# Patient Record
Sex: MALE | Race: BLACK OR AFRICAN AMERICAN | NOT HISPANIC OR LATINO | Employment: UNEMPLOYED | URBAN - METROPOLITAN AREA
[De-identification: names, ages, dates, MRNs, and addresses within clinical notes are randomized per-mention and may not be internally consistent; named-entity substitution may affect disease eponyms.]

---

## 2017-02-26 ENCOUNTER — HOSPITAL ENCOUNTER (INPATIENT)
Facility: HOSPITAL | Age: 32
LOS: 3 days | Discharge: LEFT BEFORE DISCHARGE | DRG: 156 | End: 2017-03-01
Attending: EMERGENCY MEDICINE | Admitting: EMERGENCY MEDICINE
Payer: MEDICAID

## 2017-02-26 DIAGNOSIS — J35.8 TONSILLAR BLEED: Primary | ICD-10-CM

## 2017-02-26 PROBLEM — R07.0 THROAT PAIN IN ADULT: Status: ACTIVE | Noted: 2017-02-26

## 2017-02-26 LAB
ALBUMIN SERPL BCP-MCNC: 3.9 G/DL
ALP SERPL-CCNC: 57 U/L
ALT SERPL W/O P-5'-P-CCNC: 26 U/L
ANION GAP SERPL CALC-SCNC: 10 MMOL/L
AST SERPL-CCNC: 17 U/L
BASOPHILS # BLD AUTO: 0.03 K/UL
BASOPHILS NFR BLD: 0.4 %
BILIRUB SERPL-MCNC: 0.5 MG/DL
BUN SERPL-MCNC: 12 MG/DL
CALCIUM SERPL-MCNC: 9.2 MG/DL
CHLORIDE SERPL-SCNC: 105 MMOL/L
CO2 SERPL-SCNC: 25 MMOL/L
CREAT SERPL-MCNC: 0.9 MG/DL
DIFFERENTIAL METHOD: ABNORMAL
EOSINOPHIL # BLD AUTO: 0.1 K/UL
EOSINOPHIL NFR BLD: 1.1 %
ERYTHROCYTE [DISTWIDTH] IN BLOOD BY AUTOMATED COUNT: 14.4 %
EST. GFR  (AFRICAN AMERICAN): >60 ML/MIN/1.73 M^2
EST. GFR  (NON AFRICAN AMERICAN): >60 ML/MIN/1.73 M^2
GLUCOSE SERPL-MCNC: 97 MG/DL
HCT VFR BLD AUTO: 40.9 %
HGB BLD-MCNC: 13.5 G/DL
INR PPP: 1
LYMPHOCYTES # BLD AUTO: 3.6 K/UL
LYMPHOCYTES NFR BLD: 50.2 %
MCH RBC QN AUTO: 27.6 PG
MCHC RBC AUTO-ENTMCNC: 33 %
MCV RBC AUTO: 84 FL
MONOCYTES # BLD AUTO: 0.6 K/UL
MONOCYTES NFR BLD: 8.1 %
NEUTROPHILS # BLD AUTO: 2.9 K/UL
NEUTROPHILS NFR BLD: 39.9 %
PLATELET # BLD AUTO: 349 K/UL
PMV BLD AUTO: 9.9 FL
POTASSIUM SERPL-SCNC: 3.5 MMOL/L
PROT SERPL-MCNC: 8 G/DL
PROTHROMBIN TIME: 10.9 SEC
RBC # BLD AUTO: 4.9 M/UL
SODIUM SERPL-SCNC: 140 MMOL/L
WBC # BLD AUTO: 7.15 K/UL

## 2017-02-26 PROCEDURE — G0378 HOSPITAL OBSERVATION PER HR: HCPCS

## 2017-02-26 PROCEDURE — 85025 COMPLETE CBC W/AUTO DIFF WBC: CPT

## 2017-02-26 PROCEDURE — 25000003 PHARM REV CODE 250: Performed by: HOSPITALIST

## 2017-02-26 PROCEDURE — 85610 PROTHROMBIN TIME: CPT

## 2017-02-26 PROCEDURE — 80053 COMPREHEN METABOLIC PANEL: CPT

## 2017-02-26 PROCEDURE — 11000001 HC ACUTE MED/SURG PRIVATE ROOM

## 2017-02-26 PROCEDURE — 99284 EMERGENCY DEPT VISIT MOD MDM: CPT

## 2017-02-26 PROCEDURE — 99223 1ST HOSP IP/OBS HIGH 75: CPT | Mod: ,,, | Performed by: HOSPITALIST

## 2017-02-26 RX ORDER — DEXAMETHASONE SODIUM PHOSPHATE 4 MG/ML
8 INJECTION, SOLUTION INTRA-ARTICULAR; INTRALESIONAL; INTRAMUSCULAR; INTRAVENOUS; SOFT TISSUE EVERY 8 HOURS
Status: DISCONTINUED | OUTPATIENT
Start: 2017-02-27 | End: 2017-02-26

## 2017-02-26 RX ORDER — DEXAMETHASONE SODIUM PHOSPHATE 4 MG/ML
8 INJECTION, SOLUTION INTRA-ARTICULAR; INTRALESIONAL; INTRAMUSCULAR; INTRAVENOUS; SOFT TISSUE EVERY 8 HOURS
Status: DISCONTINUED | OUTPATIENT
Start: 2017-02-27 | End: 2017-03-01 | Stop reason: HOSPADM

## 2017-02-26 RX ORDER — DEXTROSE MONOHYDRATE, SODIUM CHLORIDE, AND POTASSIUM CHLORIDE 50; 1.49; 4.5 G/1000ML; G/1000ML; G/1000ML
INJECTION, SOLUTION INTRAVENOUS CONTINUOUS
Status: DISCONTINUED | OUTPATIENT
Start: 2017-02-27 | End: 2017-03-01 | Stop reason: HOSPADM

## 2017-02-26 RX ORDER — CLINDAMYCIN PHOSPHATE 600 MG/50ML
600 INJECTION, SOLUTION INTRAVENOUS
Status: DISCONTINUED | OUTPATIENT
Start: 2017-02-26 | End: 2017-03-01 | Stop reason: HOSPADM

## 2017-02-26 RX ORDER — ONDANSETRON 2 MG/ML
4 INJECTION INTRAMUSCULAR; INTRAVENOUS EVERY 8 HOURS PRN
Status: DISCONTINUED | OUTPATIENT
Start: 2017-02-27 | End: 2017-03-01 | Stop reason: HOSPADM

## 2017-02-26 RX ORDER — MORPHINE SULFATE 2 MG/ML
2 INJECTION, SOLUTION INTRAMUSCULAR; INTRAVENOUS
Status: DISCONTINUED | OUTPATIENT
Start: 2017-02-27 | End: 2017-03-01 | Stop reason: HOSPADM

## 2017-02-26 RX ORDER — AMOXICILLIN 250 MG
2 CAPSULE ORAL DAILY
Status: DISCONTINUED | OUTPATIENT
Start: 2017-02-26 | End: 2017-03-01 | Stop reason: HOSPADM

## 2017-02-26 RX ORDER — OXYCODONE AND ACETAMINOPHEN 10; 325 MG/1; MG/1
1 TABLET ORAL EVERY 4 HOURS PRN
Status: DISCONTINUED | OUTPATIENT
Start: 2017-02-26 | End: 2017-03-01 | Stop reason: HOSPADM

## 2017-02-26 RX ADMIN — OXYCODONE HYDROCHLORIDE AND ACETAMINOPHEN 1 TABLET: 10; 325 TABLET ORAL at 09:02

## 2017-02-26 RX ADMIN — CLINDAMYCIN IN 5 PERCENT DEXTROSE 600 MG: 12 INJECTION, SOLUTION INTRAVENOUS at 11:02

## 2017-02-26 RX ADMIN — MORPHINE SULFATE 2 MG: 2 INJECTION, SOLUTION INTRAMUSCULAR; INTRAVENOUS at 11:02

## 2017-02-26 RX ADMIN — CLINDAMYCIN IN 5 PERCENT DEXTROSE 600 MG: 12 INJECTION, SOLUTION INTRAVENOUS at 03:02

## 2017-02-26 RX ADMIN — STANDARDIZED SENNA CONCENTRATE AND DOCUSATE SODIUM 2 TABLET: 8.6; 5 TABLET, FILM COATED ORAL at 09:02

## 2017-02-26 RX ADMIN — DEXTROSE MONOHYDRATE, SODIUM CHLORIDE, AND POTASSIUM CHLORIDE: 50; 4.5; 1.49 INJECTION, SOLUTION INTRAVENOUS at 11:02

## 2017-02-26 RX ADMIN — CLINDAMYCIN IN 5 PERCENT DEXTROSE 600 MG: 12 INJECTION, SOLUTION INTRAVENOUS at 09:02

## 2017-02-26 RX ADMIN — OXYCODONE HYDROCHLORIDE AND ACETAMINOPHEN 1 TABLET: 10; 325 TABLET ORAL at 04:02

## 2017-02-26 NOTE — ED NOTES
Rounding completed; no s/s of distress; respirations regular, even and unlabored; pain is currently 10/10, provider notified and with no current interventions at this time; bed in lowest position with side rails up x2; personal items and call light within reach; pt updated on POC and verbalizes understanding; continue to monitor for any changes

## 2017-02-26 NOTE — PROGRESS NOTES
"0810: Patient arrived from ED. Ambulated from stretcher to bed. Oriented to new room. VSS on RA and afebrile. Pain complaining of 10/10 pain. Left side of throat/tonsil area is swollen and red. Currently bleeding with clots present. MD at bedside and examining patient. Bed low, locked and call light is within reach. Will continue to monitor.     0915: MD at bedside again to assess large amount of bleeding with clots. IV antibiotics started and infusing. PRN meds given for pain. Ordered to call MD if bleeding persist for another hour... Patient might need possible transfer to [facility] due to ENT being unavailable at this hospital until Tuesday. Will continue to monitor bleeding and keep MD updated.     0955: Called to patients room to see large amount of blood that he is still spitting up. Patient wishes to go to [facility] regardless if bleeding stops soon because " I want this out ". Will speak to MD Baltazar and let him know patients wishes and what we need to do to start the transfer process.   "

## 2017-02-26 NOTE — H&P
History & Physical  Hospital Medicine      SUBJECTIVE:     Chief Complaint/Reason for Admission:  Left tonsil bleeding and pain    History of Present Illness:  Patient is a 31 y.o. male presents agreeing with per ED note: that has persisted for the past 8 days. The patient states that the intermittent episodes are spaced approximately 5 minutes apart. He also endorses throat pain, which he rates a 10/10 in severity. He mentions no fever, chills, epistaxis, mouth sores, or trouble swallowing. He reports no identifying, alleviating, or exacerbating factors. He denies foreced emesis or any trauma. Although he admits that he has had similar episodes in the past, approximately 1 per year, the patient claims that this episode is the most severe. He claims that he was seen for his complaint at an ED in [place] on February 18, 8 days ago, and was given ibuprofen. He states that he was seen at this facility yesterday for the same complaint and once again received ibuprofen.      He denies fever.  States he has had this problem since he was a child, and it occurs occasionally.  There was talk in the past of having his tonsils removed, but this has not occurred.  No NVD.        PTA Medications   Medication Sig    ibuprofen (ADVIL,MOTRIN) 600 MG tablet Take 1 tablet (600 mg total) by mouth every 6 (six) hours as needed for Pain.       Review of patient's allergies indicates:  No Known Allergies     Past Medical History:   Diagnosis Date    Otalgia of both ears      History reviewed. No pertinent surgical history.  Family History   Problem Relation Age of Onset    Hypertension Mother     Diabetes Father      Social History   Substance Use Topics    Smoking status: Former Smoker     Types: Cigarettes    Smokeless tobacco: None    Alcohol use No       Review of Systems:  Constitutional: no fever or chills  Eyes: no visual changes  ENT: positive for sore throat  Respiratory: no cough or shortness of breath  Cardiovascular: no  chest pain or palpitations  Gastrointestinal: no nausea or vomiting, no abdominal pain or change in bowel habits  Genitourinary: no hematuria or dysuria  Integument/Breast: no rash or pruritis  Hematologic/Lymphatic: no easy bruising or lymphadenopathy  Musculoskeletal: no arthralgias or myalgias  Neurological: no seizures or tremors  Behavioral/Psych: no auditory or visual hallucinations  Endocrine: no heat or cold intolerance    OBJECTIVE:     Vital Signs (Most Recent)  Temp: 98.4 °F (36.9 °C) (02/26/17 0810)  Pulse: 71 (02/26/17 0810)  Resp: 18 (02/26/17 0810)  BP: (!) 141/78 (02/26/17 0810)  SpO2: 100 % (02/26/17 0810)    Physical Exam:  General: well developed, well nourished, no distress  Head: normocephalic, atraumatic  Eyes:  conjunctivae/corneas clear. PERRL.  Throat: lips, mucosa, and tongue normal; teeth and gums normal and Left tonsil is swollen with sign of blood, odor possibly consistent with infection.  Airway open.    Neck: supple, symmetrical, trachea midline, no JVD and thyroid not enlarged, symmetric, no tenderness/mass/nodules  Lungs:  clear to auscultation bilaterally and normal respiratory effort  Heart: regular rate and rhythm, S1, S2 normal, no murmur, click, rub or gallop  Abdomen: soft, non-tender non-distented; bowel sounds normal; no masses,  no organomegaly  Extremities: no cyanosis or edema, or clubbing  Skin: Skin color, texture, turgor normal. No rashes or lesions  Lymph Nodes: No cervical or supraclavicular adenopathy  Neurologic: Normal strength and tone. No focal numbness or weakness      Laboratory  CBC:     Recent Labs  Lab 02/26/17  0508   WBC 7.15   RBC 4.90   HGB 13.5*   HCT 40.9      MCV 84   MCH 27.6   MCHC 33.0     CMP:     Recent Labs  Lab 02/26/17  0508   GLU 97   CALCIUM 9.2   ALBUMIN 3.9   PROT 8.0      K 3.5   CO2 25      BUN 12   CREATININE 0.9   ALKPHOS 57   ALT 26   AST 17   BILITOT 0.5     Rapid Strep NEGATIVE    Diagnostic Results:  No  new    ASSESSMENT/PLAN:     Active Hospital Problems    Diagnosis  POA    *Tonsillar bleed [J35.8]  Yes    Throat pain in adult [R07.0]  Yes      Resolved Hospital Problems    Diagnosis Date Resolved POA   No resolved problems to display.         Plan:     Left tonsillar bleeding and swelling  - Some odor to sputum, may indicate infection  - No fever, dysphagia,   - No facial or neck swelling; appears isolated to left tonsil  - Will give clindamycin IV and monitor for improvement  - Avoid NSAID to reduce bleeding  - Percocet PRN for pain.   - He is having bleeding currently, spitting out blood into emesis bag.  Maintaining airway without issue, talking without difficulty.    - ENT is not available here despite consult.  If bleeding persists, will need transfer to [facility] for ENT evaluation.     Otherwise healthy and ambulates well

## 2017-02-26 NOTE — PROGRESS NOTES
Report given to Ayah at [facility]. Patient is AAO x4. VS have been stable throughout shift. In NAD. Bleeding has currently stopped. Pain is minimal at the moment. Only required PO PRN meds twice. Tolerating liquids. Ambulating independently. Patient is up to date with POC and waiting on [facility] to transport him to [facility] where MD Gonzalez will see him. [facility] is on the way.

## 2017-02-26 NOTE — ED PROVIDER NOTES
Encounter Date: 2/26/2017    SCRIBE #1 NOTE: I, Bridgett Flynn, am scribing for, and in the presence of, Dr. Oliva.       History     Chief Complaint   Patient presents with    ENT bleeding disorder     presents with heavy bleeding bright red blood from tonsils x 4 days. Pt has approx 300 ml blood in emesis bag and water bottle from last 2 hours. Reports 10/10 throat pain     Review of patient's allergies indicates:  No Known Allergies  HPI Comments: Time seen by provider: 4:57 AM    This is a 31 y.o. male who presents with complaint of intermittent left tonsillar bleeding that has persisted for the past 8 days.  The patient states that the intermittent episodes are spaced approximately 5 minutes apart.  He also endorses throat pain, which he rates a 10/10 in severity.  He mentions no fever, chills, epistaxis, mouth sores, or trouble swallowing.  He reports no identifying, alleviating, or exacerbating factors.  He denies foreced emesis or any trauma.  Although he admits that he has had similar episodes in the past, approximately 1 per year, the patient claims that this episode is the most severe.  He claims that he was seen for his complaint at an ED in [place] on February 18, 8 days ago, and was given ibuprofen.  He states that he was seen at this facility yesterday for the same complaint and once again received ibuprofen.  The patient reports no major medical problems or daily medications.  He denies history of blood transfusion.     The history is provided by the patient.     Past Medical History:   Diagnosis Date    Otalgia of both ears      No past surgical history on file.  No family history on file.  Social History   Substance Use Topics    Smoking status: Former Smoker     Types: Cigarettes    Smokeless tobacco: Not on file    Alcohol use No     Review of Systems   Constitutional: Negative for chills and fever.   HENT: Negative for congestion, mouth sores, nosebleeds and trouble swallowing.          Positive for left tonsillar bleeding.  Positive for throat pain.   Eyes: Negative for photophobia and redness.   Respiratory: Negative for cough and shortness of breath.    Cardiovascular: Negative for chest pain.   Gastrointestinal: Negative for abdominal pain, nausea and vomiting.   Genitourinary: Negative for dysuria.   Musculoskeletal: Negative for back pain.   Skin: Negative for rash.   Neurological: Negative for weakness, light-headedness and headaches.   Psychiatric/Behavioral: Negative for confusion.       Physical Exam   Initial Vitals   BP Pulse Resp Temp SpO2   02/26/17 0446 02/26/17 0446 02/26/17 0446 02/26/17 0446 02/26/17 0446   140/91 76 18 98.3 °F (36.8 °C) 100 %     Physical Exam    Nursing note and vitals reviewed.  Constitutional: He appears well-developed and well-nourished. He is not diaphoretic. No distress.   HENT:   Head: Normocephalic and atraumatic.   Bleeding from the left tonsil from what appears to look like cuts. Oropharynx is clear and intact.  Moist mucous membranes.   Eyes: Conjunctivae and EOM are normal. Pupils are equal, round, and reactive to light.   Conjunctiva are pink, clear, and intact.   Neck: Normal range of motion. Neck supple.   Cardiovascular: Normal rate, regular rhythm, S1 normal, S2 normal and normal heart sounds. Exam reveals no gallop and no friction rub.    No murmur heard.  Pulmonary/Chest: Breath sounds normal. No respiratory distress. He has no wheezes. He has no rhonchi. He has no rales.   Clear to ascultation bilaterally.   Abdominal: Soft. Bowel sounds are normal. There is no tenderness. There is no rebound and no guarding.   No audible bruits.   Musculoskeletal: Normal range of motion. He exhibits no edema or tenderness.   No lower extremity edema.    Lymphadenopathy:     He has no cervical adenopathy.   Neurological: He is alert and oriented to person, place, and time.   Skin: Skin is warm and dry. No rash noted. No pallor.   Warm and dry. No skin tenting,  rashes, or lesions.    Psychiatric: He has a normal mood and affect. His behavior is normal. Judgment and thought content normal.         ED Course   Procedures  Labs Reviewed   CBC W/ AUTO DIFFERENTIAL - Abnormal; Notable for the following:        Result Value    Hemoglobin 13.5 (*)     Lymph% 50.2 (*)     All other components within normal limits   COMPREHENSIVE METABOLIC PANEL   PROTIME-INR     Imaging Results     None                  Medical Decision Making:   Clinical Tests:   Lab Tests: Ordered and Reviewed  Other:   I have discussed this case with another health care provider.       <> Summary of the Discussion: 6:48 AM.  I have consulted and discussed with Dr. Baltazar, who will admit the patient.            Scribe Attestation:   Scribe #1: I performed the above scribed service and the documentation accurately describes the services I performed. I attest to the accuracy of the note.    Attending Attestation:           Physician Attestation for Scribe:  Physician Attestation Statement for Scribe #1: I, Dr. Oliva, reviewed documentation, as scribed by Bridgett Flynn in my presence, and it is both accurate and complete.         Attending ED Notes:   Emergent evaluation a 31-year-old male with complaint of bleeding from left tonsil.  Patient is afebrile, nontoxic-appearing with stable vital signs except for mild elevation in blood pressure.  No recent tonsillar surgery.  On examination patient does not have epistaxis.  No anterior or posterior nasal bleeding.  Patient is bleeding from left tonsil.  No peritonsillar tonsillar abscess formation.  No tonsillar edema or swelling.  No elevation of white blood cell count.  H&H is 13.5 and 40.9.  Acute findings on CMP.  Patient has slow oozing blood from tonsillar area.  No large hemorrhage.  The patient is extensively counseled on his diagnosis and treatment including all diagnostic, laboratory and physical exam findings.  The patient is admitted in stable  condition.          ED Course     Clinical Impression:     1. Tonsillar bleed                Gopal De Luna MD  02/26/17 0752

## 2017-02-26 NOTE — ED NOTES
Pt c/o bleeding and pain from the L tonsil x2 days. Pt reports he was seen and evaluated here yesterday. Pt reports pain has increased and the bleeding has become worse. Pt is scant amount of active bleeding from L tonsil. Pt has suction set up and is using appropriately.

## 2017-02-26 NOTE — PROGRESS NOTES
Spoke with ENT resident from [facility]. Let her know that the patient will be coming to them shortly. He was assigned to the observation unit bed 8. Waiting on the transfer center to call with information about when  with [facility] will be. Patient updated about transferring over soon and agreeable.

## 2017-02-27 LAB — APTT BLDCRRT: 25.1 SEC

## 2017-02-27 PROCEDURE — 36415 COLL VENOUS BLD VENIPUNCTURE: CPT

## 2017-02-27 PROCEDURE — 63600175 PHARM REV CODE 636 W HCPCS: Performed by: OTOLARYNGOLOGY

## 2017-02-27 PROCEDURE — 85730 THROMBOPLASTIN TIME PARTIAL: CPT

## 2017-02-27 PROCEDURE — 11000001 HC ACUTE MED/SURG PRIVATE ROOM

## 2017-02-27 PROCEDURE — 25500020 PHARM REV CODE 255: Performed by: OTOLARYNGOLOGY

## 2017-02-27 PROCEDURE — 25000003 PHARM REV CODE 250: Performed by: HOSPITALIST

## 2017-02-27 PROCEDURE — 25000003 PHARM REV CODE 250: Performed by: OTOLARYNGOLOGY

## 2017-02-27 RX ADMIN — DEXAMETHASONE SODIUM PHOSPHATE 8 MG: 4 INJECTION, SOLUTION INTRAMUSCULAR; INTRAVENOUS at 05:02

## 2017-02-27 RX ADMIN — DEXTROSE MONOHYDRATE, SODIUM CHLORIDE, AND POTASSIUM CHLORIDE: 50; 4.5; 1.49 INJECTION, SOLUTION INTRAVENOUS at 11:02

## 2017-02-27 RX ADMIN — CLINDAMYCIN IN 5 PERCENT DEXTROSE 600 MG: 12 INJECTION, SOLUTION INTRAVENOUS at 11:02

## 2017-02-27 RX ADMIN — DEXAMETHASONE SODIUM PHOSPHATE 8 MG: 4 INJECTION, SOLUTION INTRAMUSCULAR; INTRAVENOUS at 12:02

## 2017-02-27 RX ADMIN — DEXTROSE MONOHYDRATE, SODIUM CHLORIDE, AND POTASSIUM CHLORIDE: 50; 4.5; 1.49 INJECTION, SOLUTION INTRAVENOUS at 09:02

## 2017-02-27 RX ADMIN — OXYCODONE HYDROCHLORIDE AND ACETAMINOPHEN 1 TABLET: 10; 325 TABLET ORAL at 11:02

## 2017-02-27 RX ADMIN — CLINDAMYCIN IN 5 PERCENT DEXTROSE 600 MG: 12 INJECTION, SOLUTION INTRAVENOUS at 05:02

## 2017-02-27 RX ADMIN — DEXAMETHASONE SODIUM PHOSPHATE 8 MG: 4 INJECTION, SOLUTION INTRAMUSCULAR; INTRAVENOUS at 10:02

## 2017-02-27 RX ADMIN — STANDARDIZED SENNA CONCENTRATE AND DOCUSATE SODIUM 2 TABLET: 8.6; 5 TABLET, FILM COATED ORAL at 09:02

## 2017-02-27 RX ADMIN — IOHEXOL 75 ML: 350 INJECTION, SOLUTION INTRAVENOUS at 02:02

## 2017-02-27 RX ADMIN — MORPHINE SULFATE 2 MG: 2 INJECTION, SOLUTION INTRAMUSCULAR; INTRAVENOUS at 05:02

## 2017-02-27 RX ADMIN — DEXAMETHASONE SODIUM PHOSPHATE 8 MG: 4 INJECTION, SOLUTION INTRAMUSCULAR; INTRAVENOUS at 02:02

## 2017-02-27 NOTE — PROGRESS NOTES
Patient admitted to Room 3 via [facility] accompanied by significant other and infant child. Patient alert and oriented x4. Dr. Yan called to bedside to evaluate patient. New orders noted. At this time, patient is spitting up mostly blood tinged sputum into blue emesis bag. Will continue to monitor.

## 2017-02-27 NOTE — PLAN OF CARE
02/27/17 1629   Discharge Assessment   Assessment Type Discharge Planning Assessment   Assessment information obtained from? Medical Record   Patient/Family In Agreement With Plan unable to assess     Danay Moreno RN, CCM [facility]  SICU/ENT/Vascular Surgery  Ext [phone]

## 2017-02-27 NOTE — PLAN OF CARE
Problem: Patient Care Overview  Goal: Plan of Care Review  Outcome: Ongoing (interventions implemented as appropriate)  Safety precautions maintained. Side rails up x2. Bed in lowest position. Call bell within reach. Patient receiving IV ABX and IVF as per standing orders. Morphine IV PRN given for pain. Will continue to monitor.

## 2017-02-27 NOTE — PROGRESS NOTES
Patient called for nurse to go to room. Moderate amount of bloody sputum noted to emesis bag at this time. Dr. Yan remains on unit and in to see patient. Dr. Yan provided yankeur suction and instructed patient to not place yankeur to back of mouth. Ice water also provided. Within minutes, sputum returned to slightly blood tinged. IV antibiotics and Morphine IVP administered at this time. Patient also complains of pain to his left ear as noted by Dr. Yan. Will continue to monitor.

## 2017-02-27 NOTE — PROGRESS NOTES
Otolaryngology - Head and Neck Surgery  Progress Note    Subjective: Overnight reported some blood in his spit. NPO since midnight. Afebrile. No difficulty breathing.    Objective:  Temp:  [98.5 °F (36.9 °C)-98.9 °F (37.2 °C)]   Pulse:  [68-98]   Resp:  [12-20]   BP: (135-141)/(78-91)   SpO2:  [100 %]     NAD, A/Ox3. No increased WOB, normal voice that is not muffled  CN II-XII intact  MMM, full tongue ROM. There is a small lac anterior to the left anterior tonsillar pillar that appears to be the source of bleeding. The left tonsil is irregular with white exudates. Right tonsil 2+, no exudates. Oral airway clear.  Neck soft, non-tender. No palpable lymphadenopathy.    CT neck w/ contrast reviewed: There is irregularity in the posterior oropharynx that represents intratonsillar abscess vs secretions.     Assessment: 31M w/ history of hemorrhagic tonsillitis admitted for sore throat and bleeding from left tonsil since 2/18. No recent OC/OP surgery. 5 pack-year smoking history, quit years ago.     Plan:   - Ok for liquid diet today  - Will add on for DLB possible tonsillectomy on Wednesday  - Continue clindamycin and decadron at this time    D/w staff Dr. Lara

## 2017-02-27 NOTE — PLAN OF CARE
Problem: Patient Care Overview  Goal: Plan of Care Review  Outcome: Ongoing (interventions implemented as appropriate)  Pt on fall precautions. Yellow fall risk band and socks on pt. Instructed pt to call for assistance as needed and pt voiced understanding. Denies pain or discomfort. Will continue to assess pt's pain and also instructed pt to notify me of any pain, pt voiced understanding. Afebrile. IVFs infusing.

## 2017-02-27 NOTE — CONSULTS
Otolaryngology - Head and Neck Surgery  Consultation Report    Consultation From: [facility]    Chief Complaint: Left tonsil swelling, and bleeding    History of Present Illness: 31 y.o. year old male presents with left tonsillar swelling and bleeding. Patient was admitted to [facility] earlier today with tonsil bleeding and left sided throat pain. Had been given antibiotics at outside hospital a week prior, pain and swelling not improved. Today starting spitting out large amounts of blood, filling multiple emesis bags per patient. Bleeding has since slowed considerably, currently spitting out minimally blood-tinged secretions. He reports that throughout the day the bleeding will start up and quickly stop.   Patient reports frequent episodes of tonsillitis since he was a child that present similarly, however never with bleeding. Has never had a peritonsillar abscess that needed to be drained. Also complains of trismus and left sided otalgia. Afebrile, no leukocytosis.    Review of Systems - Negative except above.    Past Medical History: Patient has a past medical history of Otalgia of both ears.    Past Surgical History: Patient has no past surgical history on file.    Social History: Patient reports that he has quit smoking. His smoking use included Cigarettes. He does not have any smokeless tobacco history on file. He reports that he uses illicit drugs, including Marijuana. He reports that he does not drink alcohol.    Family History: family history includes Diabetes in his father; Hypertension in his mother.    Medications:    clindamycin (CLEOCIN) IVPB  600 mg Intravenous Q6H    [START ON 2/27/2017] dexamethasone  8 mg Intravenous Q8H    senna-docusate 8.6-50 mg  2 tablet Oral Daily       Allergies: Patient has No Known Allergies.    Physical Exam:  Temp:  [98.3 °F (36.8 °C)-98.9 °F (37.2 °C)] 98.9 °F (37.2 °C)  Pulse:  [58-76] 68  Resp:  [12-20] 12  SpO2:  [97 %-100 %] 100 %  BP:  (135-172)/() 138/90        Constitutional: Well appearing / communicating.  NAD.  Eyes: EOM I Bilaterally  Head/Face: Normocephalic.  Negative paranasal sinus pressure/tenderness.  Salivary glands WNL.  House Brackmann I Bilaterally.  Nose: No gross nasal septal deviation. Inferior Turbinates 2+ bilaterally. No septal perforation. No masses/lesions. External nasal skin without masses/lesions.  Oral Cavity: Trismus present. Gingiva/lips WNL.  FOM Soft, no masses palpated. Oral Tongue mobile. Hard Palate WNL.   Oropharynx: Bilateral tonsil swelling, L> R. Both tonsils are cryptic. Left tonsil erythematous with blood clot sitting on anterior portion. No active bleeding.  Neck/Lymphatic: Shotty cervical lymphadenopathy throughout.  No thyromegaly.  No masses noted on exam.  Neuro/Psychiatric: AOx3.  Normal mood and affect.   Cardiovascular: Normal carotid pulses bilaterally, no increasing jugular venous distention noted at cervical region bilaterally.    Respiratory: Normal respiratory effort, no stridor, no retractions noted.      CBC    Recent Labs  Lab 02/26/17  0508   WBC 7.15   HGB 13.5*   HCT 40.9   MCV 84        BMP    Recent Labs  Lab 02/26/17  0508   GLU 97      K 3.5      CO2 25   BUN 12   CREATININE 0.9   CALCIUM 9.2       Imaging Results     None           Assessment: 30 yo man transferred from [facility] with possible left peritonsillar abscess in setting of chronic tonsillitis with left-sided tonsillar bleeding, currently stable    Plan:   -Admit to ENT  -CT w/ contrast prior to performing I&D in setting of bleeding  -IV clindamycin, IV decadron, MIVF  -NPO  -percocet, morphine for pain control  -d/w Dr. Gonzalez

## 2017-02-28 PROCEDURE — 63600175 PHARM REV CODE 636 W HCPCS: Performed by: OTOLARYNGOLOGY

## 2017-02-28 PROCEDURE — 99231 SBSQ HOSP IP/OBS SF/LOW 25: CPT | Mod: ,,, | Performed by: OTOLARYNGOLOGY

## 2017-02-28 PROCEDURE — 25000003 PHARM REV CODE 250: Performed by: HOSPITALIST

## 2017-02-28 PROCEDURE — 11000001 HC ACUTE MED/SURG PRIVATE ROOM

## 2017-02-28 PROCEDURE — 25000003 PHARM REV CODE 250: Performed by: OTOLARYNGOLOGY

## 2017-02-28 RX ADMIN — CLINDAMYCIN IN 5 PERCENT DEXTROSE 600 MG: 12 INJECTION, SOLUTION INTRAVENOUS at 11:02

## 2017-02-28 RX ADMIN — STANDARDIZED SENNA CONCENTRATE AND DOCUSATE SODIUM 2 TABLET: 8.6; 5 TABLET, FILM COATED ORAL at 11:02

## 2017-02-28 RX ADMIN — DEXAMETHASONE SODIUM PHOSPHATE 8 MG: 4 INJECTION, SOLUTION INTRAMUSCULAR; INTRAVENOUS at 05:02

## 2017-02-28 RX ADMIN — DEXAMETHASONE SODIUM PHOSPHATE 8 MG: 4 INJECTION, SOLUTION INTRAMUSCULAR; INTRAVENOUS at 02:02

## 2017-02-28 RX ADMIN — CLINDAMYCIN IN 5 PERCENT DEXTROSE 600 MG: 12 INJECTION, SOLUTION INTRAVENOUS at 05:02

## 2017-02-28 RX ADMIN — DEXTROSE MONOHYDRATE, SODIUM CHLORIDE, AND POTASSIUM CHLORIDE: 50; 4.5; 1.49 INJECTION, SOLUTION INTRAVENOUS at 05:02

## 2017-02-28 RX ADMIN — DEXAMETHASONE SODIUM PHOSPHATE 8 MG: 4 INJECTION, SOLUTION INTRAMUSCULAR; INTRAVENOUS at 11:02

## 2017-02-28 RX ADMIN — OXYCODONE HYDROCHLORIDE AND ACETAMINOPHEN 1 TABLET: 10; 325 TABLET ORAL at 09:02

## 2017-02-28 NOTE — PROGRESS NOTES
Otolaryngology - Head and Neck Surgery  Progress Note    Subjective: NAEON. Bleeding has stopped from left tonsil. Throat pain, trismus, otalgia improved.    Objective:  Temp:  [98 °F (36.7 °C)-99 °F (37.2 °C)]   Pulse:  []   Resp:  [16-18]   BP: (132-148)/(79-96)   SpO2:  [99 %-100 %]     NAD, A/Ox3. No increased WOB, normal voice that is not muffled  CN II-XII intact  MMM, full tongue ROM. Bilateral tonsils 2+. No exudates or blood noted today  Neck soft, non-tender. No palpable lymphadenopathy.    CT neck w/ contrast reviewed: There is irregularity in the posterior oropharynx that represents intratonsillar abscess vs secretions.     Assessment: 31M w/ history of hemorrhagic tonsillitis admitted for sore throat and bleeding from left tonsil since 2/18. No recent OC/OP surgery. 5 pack-year smoking history, quit years ago.     Plan:   - to OR tomorrow for DLB possible tonsillectomy   - will obtain consents today  - NPO at midnight  - Continue clindamycin and decadron at this time    D/w staff Dr. Gonzalez

## 2017-02-28 NOTE — PLAN OF CARE
Problem: Patient Care Overview  Goal: Plan of Care Review  Outcome: Ongoing (interventions implemented as appropriate)  Patient maintained on fall precautions. Bed locked and lowest position. Call light within reach. Patient assessed for pain and prn medication administered as per order. Patient has swollen tonsils with no bleeding noted at this time. Patient instructed to use call light prn. Patient verbalized understanding. Plan of care updated.

## 2017-02-28 NOTE — PLAN OF CARE
Problem: Patient Care Overview  Goal: Plan of Care Review  Outcome: Ongoing (interventions implemented as appropriate)  Safety precautions maintained. Side rails up x2. Bed in lowest position. Call bell within reach. IVF infusing as ordered. IV antibiotics administered as ordered. Tonsils swollen with no bleeding noted. Patient denies any need for pain medications at this time. Will continue to monitor.

## 2017-03-01 VITALS
TEMPERATURE: 99 F | HEIGHT: 69 IN | RESPIRATION RATE: 18 BRPM | SYSTOLIC BLOOD PRESSURE: 148 MMHG | DIASTOLIC BLOOD PRESSURE: 86 MMHG | HEART RATE: 90 BPM | BODY MASS INDEX: 22.22 KG/M2 | OXYGEN SATURATION: 99 % | WEIGHT: 150 LBS

## 2017-03-01 PROCEDURE — 25000003 PHARM REV CODE 250: Performed by: OTOLARYNGOLOGY

## 2017-03-01 PROCEDURE — 25000003 PHARM REV CODE 250: Performed by: HOSPITALIST

## 2017-03-01 PROCEDURE — 63600175 PHARM REV CODE 636 W HCPCS: Performed by: OTOLARYNGOLOGY

## 2017-03-01 RX ADMIN — STANDARDIZED SENNA CONCENTRATE AND DOCUSATE SODIUM 2 TABLET: 8.6; 5 TABLET, FILM COATED ORAL at 09:03

## 2017-03-01 RX ADMIN — DEXTROSE MONOHYDRATE, SODIUM CHLORIDE, AND POTASSIUM CHLORIDE: 50; 4.5; 1.49 INJECTION, SOLUTION INTRAVENOUS at 09:03

## 2017-03-01 RX ADMIN — CLINDAMYCIN IN 5 PERCENT DEXTROSE 600 MG: 12 INJECTION, SOLUTION INTRAVENOUS at 12:03

## 2017-03-01 RX ADMIN — DEXAMETHASONE SODIUM PHOSPHATE 8 MG: 4 INJECTION, SOLUTION INTRAMUSCULAR; INTRAVENOUS at 05:03

## 2017-03-01 RX ADMIN — CLINDAMYCIN IN 5 PERCENT DEXTROSE 600 MG: 12 INJECTION, SOLUTION INTRAVENOUS at 05:03

## 2017-03-01 NOTE — PLAN OF CARE
Problem: Patient Care Overview  Goal: Plan of Care Review  Outcome: Ongoing (interventions implemented as appropriate)  Patient maintained on fall precautions. Bed locked and lowest position. Call light within reach. Patient denies any pain at this time. Patient has IV fluids. Plan of care updated.

## 2017-03-01 NOTE — PLAN OF CARE
Problem: Patient Care Overview  Goal: Plan of Care Review  Outcome: Ongoing (interventions implemented as appropriate)  Rounding every 2 hours to decrease risk of falls. Administer pain medication per MD order to control pain.  Encourage fluids to decrease fluid volume deficit.

## 2017-03-01 NOTE — PROGRESS NOTES
ENT MD paged by nursing @ 2:17 PM to inform that patient has likely left AMA. IV is removed, hospital gown is off, and personal belonging are not @ bedside. Patient was scheduled for DLB c possible tonsillectomy today with Dr. Lara.  He should contact ENT clinic to reschedule @ his convenience.  Please call ENT with any questions or cocnerns.

## 2017-03-01 NOTE — PROGRESS NOTES
During hourly rounding, patient noted to be gone from room. IV noted to be pulled out and on bedside table. Gown on patient bed. Personal belongings removed. Patient was paged overhead with no response. Called patient's number and left message. Called emergency contact and spoke with mother who stated she will try and get in contact with patient. Notified nursing supervisor eliezer. Notified Dr. Dominguez.

## 2017-03-01 NOTE — PROGRESS NOTES
"Called to patient's room as patient had questions regarding procedure time. Spoke with nurse in DOS that stated the procedure was pushed back to about 4 pm. Patient notified regarding procedure time and he stated, "well they better come and get me at 4 then."  "

## 2017-03-01 NOTE — PROGRESS NOTES
Otolaryngology - Head and Neck Surgery  Progress Note    Subjective: NAEON. Bleeding has stopped from left tonsil. Throat pain, trismus, otalgia continued improvement.    Objective:  Temp:  [98 °F (36.7 °C)-98.6 °F (37 °C)]   Pulse:  [68-97]   Resp:  [18]   BP: (122-158)/(62-96)   SpO2:  [98 %-100 %]     NAD, A/Ox3. No increased WOB, normal voice that is not muffled  CN II-XII intact  MMM, full tongue ROM. Bilateral tonsils 2+. No exudates or blood noted today  Neck soft, non-tender. No palpable lymphadenopathy.    CT neck w/ contrast reviewed: There is irregularity in the posterior oropharynx that represents intratonsillar abscess vs secretions.     Assessment: 31M w/ history of hemorrhagic tonsillitis admitted for sore throat and bleeding from left tonsil since 2/18. No recent OC/OP surgery. 5 pack-year smoking history, quit years ago.     Plan:   - to OR today for DLB c possible tonsillectomy   - NPO c MIVF  - Continue clindamycin and decadron at this time

## 2017-03-01 NOTE — PROGRESS NOTES
Patient left AMA without notifying nurse. No discharge instructions given to pt as patient was suppose to have surgery today.

## 2017-03-06 NOTE — DISCHARGE SUMMARY
[facility]  Discharge Summary  Otorhinolaryngology      Admit Date: 2/26/2017    Discharge Date and Time: 3/1/2017  2:43 PM    Attending Physician: Valerie Gonzalez    Discharge Physician: Valerie Gonzalez    Reason for Admission: hemorrhagic tonsillitis    Procedures Performed: Procedure(s) (LRB):  Direct laryngoscopy possible tonsillectomy (N/A) cancelled by patient    Hospital Course (synopsis of major diagnoses, care, treatment, and services provided during the course of the hospital stay): the patient was admitted after several episodes of hemoptysis. All seemed to occur after he was told he would be discharge. A CT was done to rule out vascular lesion. There appeared to be a defect in superior to the left superior tonsillar pole consistent with the area of blood clot seen on exam. Differential included neoplasia vs laceration by the patient. He was scheduled for a DLB with possible tonsillectomy on Wednesday. Throughout his hospital stay he had no further bleeding. On the day of surgery, he decided to go home rather than proceed with surgery. Discharged home    Consults: none    Significant Diagnostic Studies: Radiology: CT scan: neck: air in the soft tissue and slight edema superior to left tonsil pole.    Final Diagnoses:    Principal Problem: Tonsillar bleed   Secondary Diagnoses:   Active Hospital Problems    Diagnosis  POA    *Tonsillar bleed [J35.8]  Yes    Throat pain in adult [R07.0]  Yes      Resolved Hospital Problems    Diagnosis Date Resolved POA   No resolved problems to display.       Discharged Condition: good    Disposition: Home or Self Care    Follow Up/Patient Instructions:      Medications:  Reconciled Home Medications:   Discharge Medication List as of 3/1/2017  2:47 PM      CONTINUE these medications which have NOT CHANGED    Details   ibuprofen (ADVIL,MOTRIN) 600 MG tablet Take 1 tablet (600 mg total) by mouth every 6 (six) hours as needed for Pain., Starting  2/25/2017, Until Mon 3/27/17, Print             No discharge procedures on file.

## 2019-12-28 ENCOUNTER — HOSPITAL ENCOUNTER (EMERGENCY)
Facility: OTHER | Age: 34
Discharge: HOME | End: 2019-12-28
Attending: EMERGENCY MEDICINE
Payer: MEDICAID

## 2019-12-28 VITALS
TEMPERATURE: 98 F | WEIGHT: 150 LBS | SYSTOLIC BLOOD PRESSURE: 130 MMHG | RESPIRATION RATE: 18 BRPM | HEART RATE: 78 BPM | BODY MASS INDEX: 22.22 KG/M2 | OXYGEN SATURATION: 100 % | HEIGHT: 69 IN | DIASTOLIC BLOOD PRESSURE: 80 MMHG

## 2019-12-28 DIAGNOSIS — S56.911A STRAIN OF RIGHT FOREARM, INITIAL ENCOUNTER: ICD-10-CM

## 2019-12-28 DIAGNOSIS — M25.519 ACUTE SHOULDER PAIN: Primary | ICD-10-CM

## 2019-12-28 PROCEDURE — 99284 EMERGENCY DEPT VISIT MOD MDM: CPT | Mod: 25

## 2019-12-28 PROCEDURE — 25000003 PHARM REV CODE 250: Performed by: EMERGENCY MEDICINE

## 2019-12-28 RX ORDER — LIDOCAINE 50 MG/G
PATCH TOPICAL
Qty: 30 PATCH | Refills: 0 | Status: SHIPPED | OUTPATIENT
Start: 2019-12-28

## 2019-12-28 RX ORDER — METHOCARBAMOL 750 MG/1
1500 TABLET, FILM COATED ORAL 3 TIMES DAILY PRN
Qty: 30 TABLET | Refills: 0 | Status: SHIPPED | OUTPATIENT
Start: 2019-12-28 | End: 2020-01-02

## 2019-12-28 RX ORDER — METHOCARBAMOL 750 MG/1
1500 TABLET, FILM COATED ORAL
Status: COMPLETED | OUTPATIENT
Start: 2019-12-28 | End: 2019-12-28

## 2019-12-28 RX ORDER — NAPROXEN 500 MG/1
500 TABLET ORAL
Status: COMPLETED | OUTPATIENT
Start: 2019-12-28 | End: 2019-12-28

## 2019-12-28 RX ORDER — NAPROXEN 500 MG/1
500 TABLET ORAL 2 TIMES DAILY PRN
Qty: 60 TABLET | Refills: 0 | Status: SHIPPED | OUTPATIENT
Start: 2019-12-28

## 2019-12-28 RX ADMIN — NAPROXEN 500 MG: 500 TABLET ORAL at 06:12

## 2019-12-28 RX ADMIN — METHOCARBAMOL TABLETS 1500 MG: 750 TABLET, COATED ORAL at 06:12

## 2019-12-28 NOTE — DISCHARGE INSTRUCTIONS
Call your primary care doctor to make the first available appointment.     Keep all your medical appointments.     Take your regular medication as prescribed. Contact your primary care provider before running out of medication, or for any problems obtaining them.    Do not drive or operate heavy machinery while taking opioid or muscle relaxing medications. Examples include norco, percocet, xanax, valium, flexeril.     Overuse or long term use of pain and sedating medication may lead to addiction, dependence, organ failure, family and work problems, legal problems, accidental overdose and death.    If you do not have health insurance, you probably qualify for heavily discounted rates:  Call 1-163.429.6381 ([facility] hotline) or go to www.[employer].[place].gov    Your evaluation in the ED does not suggest any emergent or life threatening medical condition requiring admission or immediate intervention beyond that provided in the ED.   However, the signs of a serious problem sometimes take more time to appear.   RETURN TO THE ER if any of the following occur:    Weakness, dizziness, fainting, or loss of consciousness   Fever of 100.4ºF (38ºC) or higher  Any worse symptoms  Any new or concerning symptoms
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
